# Patient Record
Sex: MALE | Race: WHITE | NOT HISPANIC OR LATINO
[De-identification: names, ages, dates, MRNs, and addresses within clinical notes are randomized per-mention and may not be internally consistent; named-entity substitution may affect disease eponyms.]

---

## 2017-02-02 ENCOUNTER — APPOINTMENT (OUTPATIENT)
Dept: OPHTHALMOLOGY | Facility: CLINIC | Age: 67
End: 2017-02-02

## 2017-02-13 ENCOUNTER — APPOINTMENT (OUTPATIENT)
Dept: OPHTHALMOLOGY | Facility: CLINIC | Age: 67
End: 2017-02-13

## 2017-02-24 ENCOUNTER — APPOINTMENT (OUTPATIENT)
Dept: OPHTHALMOLOGY | Facility: CLINIC | Age: 67
End: 2017-02-24

## 2017-03-17 ENCOUNTER — APPOINTMENT (OUTPATIENT)
Dept: OPHTHALMOLOGY | Facility: CLINIC | Age: 67
End: 2017-03-17

## 2017-04-21 ENCOUNTER — APPOINTMENT (OUTPATIENT)
Dept: OPHTHALMOLOGY | Facility: CLINIC | Age: 67
End: 2017-04-21

## 2017-06-23 ENCOUNTER — APPOINTMENT (OUTPATIENT)
Dept: OPHTHALMOLOGY | Facility: CLINIC | Age: 67
End: 2017-06-23

## 2017-12-14 ENCOUNTER — APPOINTMENT (OUTPATIENT)
Dept: OPHTHALMOLOGY | Facility: CLINIC | Age: 67
End: 2017-12-14
Payer: COMMERCIAL

## 2017-12-14 DIAGNOSIS — H59.89 OTHER POSTPROCEDURAL COMPLICATIONS AND DISORDERS OF EYE AND ADNEXA, NOT ELSEWHERE CLASSIFIED: ICD-10-CM

## 2017-12-14 PROCEDURE — 92083 EXTENDED VISUAL FIELD XM: CPT

## 2017-12-14 PROCEDURE — 92012 INTRM OPH EXAM EST PATIENT: CPT

## 2018-01-05 ENCOUNTER — APPOINTMENT (OUTPATIENT)
Dept: OPHTHALMOLOGY | Facility: CLINIC | Age: 68
End: 2018-01-05
Payer: COMMERCIAL

## 2018-01-05 PROCEDURE — 92012 INTRM OPH EXAM EST PATIENT: CPT

## 2018-01-26 ENCOUNTER — APPOINTMENT (OUTPATIENT)
Dept: OPHTHALMOLOGY | Facility: CLINIC | Age: 68
End: 2018-01-26
Payer: COMMERCIAL

## 2018-01-26 PROCEDURE — 92012 INTRM OPH EXAM EST PATIENT: CPT

## 2018-04-26 ENCOUNTER — APPOINTMENT (OUTPATIENT)
Dept: OPHTHALMOLOGY | Facility: CLINIC | Age: 68
End: 2018-04-26
Payer: COMMERCIAL

## 2018-04-26 PROCEDURE — 92250 FUNDUS PHOTOGRAPHY W/I&R: CPT

## 2018-04-26 PROCEDURE — 92083 EXTENDED VISUAL FIELD XM: CPT

## 2018-04-26 PROCEDURE — 92020 GONIOSCOPY: CPT

## 2018-04-26 PROCEDURE — 92014 COMPRE OPH EXAM EST PT 1/>: CPT

## 2018-04-26 PROCEDURE — ZZZZZ: CPT

## 2018-07-20 ENCOUNTER — RX RENEWAL (OUTPATIENT)
Age: 68
End: 2018-07-20

## 2018-07-26 ENCOUNTER — APPOINTMENT (OUTPATIENT)
Dept: OPHTHALMOLOGY | Facility: CLINIC | Age: 68
End: 2018-07-26
Payer: COMMERCIAL

## 2018-07-26 PROCEDURE — ZZZZZ: CPT

## 2018-07-26 PROCEDURE — 92012 INTRM OPH EXAM EST PATIENT: CPT

## 2018-11-09 ENCOUNTER — RX RENEWAL (OUTPATIENT)
Age: 68
End: 2018-11-09

## 2018-11-15 ENCOUNTER — APPOINTMENT (OUTPATIENT)
Dept: OPHTHALMOLOGY | Facility: CLINIC | Age: 68
End: 2018-11-15
Payer: COMMERCIAL

## 2018-11-15 PROCEDURE — 92014 COMPRE OPH EXAM EST PT 1/>: CPT

## 2018-11-15 RX ORDER — NETARSUDIL 0.2 MG/ML
0.02 SOLUTION/ DROPS OPHTHALMIC; TOPICAL
Qty: 1 | Refills: 6 | Status: ACTIVE | COMMUNITY
Start: 2018-11-15 | End: 1900-01-01

## 2019-01-17 ENCOUNTER — APPOINTMENT (OUTPATIENT)
Dept: OPHTHALMOLOGY | Facility: CLINIC | Age: 69
End: 2019-01-17
Payer: COMMERCIAL

## 2019-01-17 DIAGNOSIS — H40.2213 CHRONIC ANGLE-CLOSURE GLAUCOMA, RIGHT EYE, SEVERE STAGE: ICD-10-CM

## 2019-01-17 DIAGNOSIS — H40.2223 CHRONIC ANGLE-CLOSURE GLAUCOMA, LEFT EYE, SEVERE STAGE: ICD-10-CM

## 2019-01-17 PROCEDURE — 92020 GONIOSCOPY: CPT

## 2019-01-17 PROCEDURE — 92014 COMPRE OPH EXAM EST PT 1/>: CPT

## 2019-01-17 PROCEDURE — 92083 EXTENDED VISUAL FIELD XM: CPT

## 2019-01-17 PROCEDURE — 92250 FUNDUS PHOTOGRAPHY W/I&R: CPT

## 2019-01-17 RX ORDER — GENTAMICIN SULFATE 3 MG/ML
0.3 SOLUTION OPHTHALMIC
Qty: 1 | Refills: 3 | Status: DISCONTINUED | COMMUNITY
Start: 2017-02-06 | End: 2019-01-17

## 2019-05-16 ENCOUNTER — NON-APPOINTMENT (OUTPATIENT)
Age: 69
End: 2019-05-16

## 2019-05-16 ENCOUNTER — APPOINTMENT (OUTPATIENT)
Dept: OPHTHALMOLOGY | Facility: CLINIC | Age: 69
End: 2019-05-16
Payer: COMMERCIAL

## 2019-05-16 PROCEDURE — 92083 EXTENDED VISUAL FIELD XM: CPT

## 2019-05-16 PROCEDURE — 92012 INTRM OPH EXAM EST PATIENT: CPT

## 2019-10-30 ENCOUNTER — NON-APPOINTMENT (OUTPATIENT)
Age: 69
End: 2019-10-30

## 2019-10-30 ENCOUNTER — APPOINTMENT (OUTPATIENT)
Dept: OPHTHALMOLOGY | Facility: CLINIC | Age: 69
End: 2019-10-30
Payer: COMMERCIAL

## 2019-10-30 PROCEDURE — 92012 INTRM OPH EXAM EST PATIENT: CPT

## 2019-10-30 PROCEDURE — 92083 EXTENDED VISUAL FIELD XM: CPT

## 2019-12-11 ENCOUNTER — NON-APPOINTMENT (OUTPATIENT)
Age: 69
End: 2019-12-11

## 2019-12-11 ENCOUNTER — APPOINTMENT (OUTPATIENT)
Dept: OPHTHALMOLOGY | Facility: CLINIC | Age: 69
End: 2019-12-11
Payer: COMMERCIAL

## 2019-12-11 PROCEDURE — 92012 INTRM OPH EXAM EST PATIENT: CPT

## 2020-03-12 ENCOUNTER — NON-APPOINTMENT (OUTPATIENT)
Age: 70
End: 2020-03-12

## 2020-03-12 ENCOUNTER — APPOINTMENT (OUTPATIENT)
Dept: OPHTHALMOLOGY | Facility: CLINIC | Age: 70
End: 2020-03-12
Payer: COMMERCIAL

## 2020-03-12 PROCEDURE — 92083 EXTENDED VISUAL FIELD XM: CPT

## 2020-03-12 PROCEDURE — 92250 FUNDUS PHOTOGRAPHY W/I&R: CPT | Mod: RT

## 2020-03-12 PROCEDURE — 92014 COMPRE OPH EXAM EST PT 1/>: CPT

## 2020-07-02 ENCOUNTER — APPOINTMENT (OUTPATIENT)
Dept: OPHTHALMOLOGY | Facility: CLINIC | Age: 70
End: 2020-07-02
Payer: COMMERCIAL

## 2020-07-02 ENCOUNTER — NON-APPOINTMENT (OUTPATIENT)
Age: 70
End: 2020-07-02

## 2020-07-02 PROCEDURE — 92133 CPTRZD OPH DX IMG PST SGM ON: CPT

## 2020-07-02 PROCEDURE — 99213 OFFICE O/P EST LOW 20 MIN: CPT

## 2021-01-04 ENCOUNTER — APPOINTMENT (OUTPATIENT)
Dept: OPHTHALMOLOGY | Facility: CLINIC | Age: 71
End: 2021-01-04
Payer: COMMERCIAL

## 2021-01-04 ENCOUNTER — NON-APPOINTMENT (OUTPATIENT)
Age: 71
End: 2021-01-04

## 2021-01-04 PROCEDURE — 99072 ADDL SUPL MATRL&STAF TM PHE: CPT

## 2021-01-04 PROCEDURE — 92136 OPHTHALMIC BIOMETRY: CPT

## 2021-01-04 PROCEDURE — 92083 EXTENDED VISUAL FIELD XM: CPT

## 2021-01-04 PROCEDURE — 99213 OFFICE O/P EST LOW 20 MIN: CPT

## 2021-02-10 ENCOUNTER — TRANSCRIPTION ENCOUNTER (OUTPATIENT)
Age: 71
End: 2021-02-10

## 2021-02-19 ENCOUNTER — APPOINTMENT (OUTPATIENT)
Dept: OPHTHALMOLOGY | Facility: CLINIC | Age: 71
End: 2021-02-19
Payer: COMMERCIAL

## 2021-02-19 ENCOUNTER — NON-APPOINTMENT (OUTPATIENT)
Age: 71
End: 2021-02-19

## 2021-02-19 PROCEDURE — 99442: CPT

## 2021-03-01 ENCOUNTER — TRANSCRIPTION ENCOUNTER (OUTPATIENT)
Age: 71
End: 2021-03-01

## 2021-03-02 ENCOUNTER — APPOINTMENT (OUTPATIENT)
Dept: OPHTHALMOLOGY | Facility: AMBULATORY SURGERY CENTER | Age: 71
End: 2021-03-02

## 2021-03-02 ENCOUNTER — OUTPATIENT (OUTPATIENT)
Dept: OUTPATIENT SERVICES | Facility: HOSPITAL | Age: 71
LOS: 1 days | Discharge: HOME | End: 2021-03-02
Payer: COMMERCIAL

## 2021-03-02 PROCEDURE — V2787: CPT

## 2021-03-02 PROCEDURE — 66982 XCAPSL CTRC RMVL CPLX WO ECP: CPT | Mod: RT

## 2021-03-02 PROCEDURE — 66250 FOLLOW-UP SURGERY OF EYE: CPT | Mod: 58,RT

## 2021-03-03 ENCOUNTER — APPOINTMENT (OUTPATIENT)
Dept: OPHTHALMOLOGY | Facility: CLINIC | Age: 71
End: 2021-03-03
Payer: COMMERCIAL

## 2021-03-03 ENCOUNTER — NON-APPOINTMENT (OUTPATIENT)
Age: 71
End: 2021-03-03

## 2021-03-03 PROCEDURE — 99024 POSTOP FOLLOW-UP VISIT: CPT

## 2021-03-10 ENCOUNTER — NON-APPOINTMENT (OUTPATIENT)
Age: 71
End: 2021-03-10

## 2021-03-10 ENCOUNTER — APPOINTMENT (OUTPATIENT)
Dept: OPHTHALMOLOGY | Facility: CLINIC | Age: 71
End: 2021-03-10
Payer: COMMERCIAL

## 2021-03-10 PROCEDURE — 99024 POSTOP FOLLOW-UP VISIT: CPT

## 2021-03-24 ENCOUNTER — NON-APPOINTMENT (OUTPATIENT)
Age: 71
End: 2021-03-24

## 2021-03-24 ENCOUNTER — APPOINTMENT (OUTPATIENT)
Dept: OPHTHALMOLOGY | Facility: CLINIC | Age: 71
End: 2021-03-24
Payer: COMMERCIAL

## 2021-03-24 PROCEDURE — 99024 POSTOP FOLLOW-UP VISIT: CPT

## 2021-04-14 ENCOUNTER — NON-APPOINTMENT (OUTPATIENT)
Age: 71
End: 2021-04-14

## 2021-04-14 ENCOUNTER — APPOINTMENT (OUTPATIENT)
Dept: OPHTHALMOLOGY | Facility: CLINIC | Age: 71
End: 2021-04-14
Payer: COMMERCIAL

## 2021-04-14 PROCEDURE — 99024 POSTOP FOLLOW-UP VISIT: CPT

## 2021-04-16 ENCOUNTER — NON-APPOINTMENT (OUTPATIENT)
Age: 71
End: 2021-04-16

## 2021-04-16 ENCOUNTER — APPOINTMENT (OUTPATIENT)
Dept: OPHTHALMOLOGY | Facility: CLINIC | Age: 71
End: 2021-04-16
Payer: COMMERCIAL

## 2021-04-16 PROCEDURE — 99072 ADDL SUPL MATRL&STAF TM PHE: CPT

## 2021-04-16 PROCEDURE — 92014 COMPRE OPH EXAM EST PT 1/>: CPT | Mod: 24

## 2021-04-16 PROCEDURE — 92020 GONIOSCOPY: CPT

## 2021-04-20 ENCOUNTER — TRANSCRIPTION ENCOUNTER (OUTPATIENT)
Age: 71
End: 2021-04-20

## 2021-04-21 ENCOUNTER — NON-APPOINTMENT (OUTPATIENT)
Age: 71
End: 2021-04-21

## 2021-04-21 ENCOUNTER — APPOINTMENT (OUTPATIENT)
Dept: OPHTHALMOLOGY | Facility: AMBULATORY SURGERY CENTER | Age: 71
End: 2021-04-21

## 2021-04-21 ENCOUNTER — OUTPATIENT (OUTPATIENT)
Dept: OUTPATIENT SERVICES | Facility: HOSPITAL | Age: 71
LOS: 1 days | Discharge: HOME | End: 2021-04-21
Payer: COMMERCIAL

## 2021-04-21 PROCEDURE — 66180 AQUEOUS SHUNT EYE W/GRAFT: CPT | Mod: RT,79

## 2021-04-22 ENCOUNTER — APPOINTMENT (OUTPATIENT)
Dept: OPHTHALMOLOGY | Facility: CLINIC | Age: 71
End: 2021-04-22
Payer: COMMERCIAL

## 2021-04-22 ENCOUNTER — NON-APPOINTMENT (OUTPATIENT)
Age: 71
End: 2021-04-22

## 2021-04-22 PROCEDURE — 99024 POSTOP FOLLOW-UP VISIT: CPT

## 2021-04-26 ENCOUNTER — APPOINTMENT (OUTPATIENT)
Dept: OPHTHALMOLOGY | Facility: CLINIC | Age: 71
End: 2021-04-26
Payer: COMMERCIAL

## 2021-04-26 ENCOUNTER — NON-APPOINTMENT (OUTPATIENT)
Age: 71
End: 2021-04-26

## 2021-04-26 PROCEDURE — 99024 POSTOP FOLLOW-UP VISIT: CPT

## 2021-04-28 ENCOUNTER — APPOINTMENT (OUTPATIENT)
Dept: OPHTHALMOLOGY | Facility: CLINIC | Age: 71
End: 2021-04-28
Payer: COMMERCIAL

## 2021-04-28 ENCOUNTER — NON-APPOINTMENT (OUTPATIENT)
Age: 71
End: 2021-04-28

## 2021-04-28 PROCEDURE — 99024 POSTOP FOLLOW-UP VISIT: CPT

## 2021-05-07 ENCOUNTER — NON-APPOINTMENT (OUTPATIENT)
Age: 71
End: 2021-05-07

## 2021-05-07 ENCOUNTER — APPOINTMENT (OUTPATIENT)
Dept: OPHTHALMOLOGY | Facility: CLINIC | Age: 71
End: 2021-05-07
Payer: COMMERCIAL

## 2021-05-07 PROCEDURE — 99024 POSTOP FOLLOW-UP VISIT: CPT

## 2021-05-25 ENCOUNTER — NON-APPOINTMENT (OUTPATIENT)
Age: 71
End: 2021-05-25

## 2021-05-25 ENCOUNTER — APPOINTMENT (OUTPATIENT)
Dept: OPHTHALMOLOGY | Facility: CLINIC | Age: 71
End: 2021-05-25

## 2021-05-25 ENCOUNTER — APPOINTMENT (OUTPATIENT)
Dept: OPHTHALMOLOGY | Facility: CLINIC | Age: 71
End: 2021-05-25
Payer: COMMERCIAL

## 2021-05-25 PROCEDURE — 99024 POSTOP FOLLOW-UP VISIT: CPT

## 2021-06-04 ENCOUNTER — NON-APPOINTMENT (OUTPATIENT)
Age: 71
End: 2021-06-04

## 2021-06-04 ENCOUNTER — APPOINTMENT (OUTPATIENT)
Dept: OPHTHALMOLOGY | Facility: CLINIC | Age: 71
End: 2021-06-04
Payer: COMMERCIAL

## 2021-06-04 PROCEDURE — 99024 POSTOP FOLLOW-UP VISIT: CPT

## 2021-06-24 ENCOUNTER — NON-APPOINTMENT (OUTPATIENT)
Age: 71
End: 2021-06-24

## 2021-06-24 ENCOUNTER — TRANSCRIPTION ENCOUNTER (OUTPATIENT)
Age: 71
End: 2021-06-24

## 2021-06-24 ENCOUNTER — APPOINTMENT (OUTPATIENT)
Dept: OPHTHALMOLOGY | Facility: CLINIC | Age: 71
End: 2021-06-24
Payer: COMMERCIAL

## 2021-06-24 PROCEDURE — 99024 POSTOP FOLLOW-UP VISIT: CPT

## 2021-08-02 ENCOUNTER — APPOINTMENT (OUTPATIENT)
Dept: OPHTHALMOLOGY | Facility: CLINIC | Age: 71
End: 2021-08-02
Payer: COMMERCIAL

## 2021-08-02 ENCOUNTER — NON-APPOINTMENT (OUTPATIENT)
Age: 71
End: 2021-08-02

## 2021-08-02 PROCEDURE — 92133 CPTRZD OPH DX IMG PST SGM ON: CPT

## 2021-08-02 PROCEDURE — 92014 COMPRE OPH EXAM EST PT 1/>: CPT

## 2021-11-08 ENCOUNTER — NON-APPOINTMENT (OUTPATIENT)
Age: 71
End: 2021-11-08

## 2021-11-08 ENCOUNTER — APPOINTMENT (OUTPATIENT)
Dept: OPHTHALMOLOGY | Facility: CLINIC | Age: 71
End: 2021-11-08
Payer: COMMERCIAL

## 2021-11-08 PROCEDURE — 92083 EXTENDED VISUAL FIELD XM: CPT

## 2021-11-08 PROCEDURE — 92133 CPTRZD OPH DX IMG PST SGM ON: CPT

## 2021-11-08 PROCEDURE — 92014 COMPRE OPH EXAM EST PT 1/>: CPT

## 2021-11-15 ENCOUNTER — NON-APPOINTMENT (OUTPATIENT)
Age: 71
End: 2021-11-15

## 2021-11-15 ENCOUNTER — APPOINTMENT (OUTPATIENT)
Dept: OPHTHALMOLOGY | Facility: CLINIC | Age: 71
End: 2021-11-15
Payer: COMMERCIAL

## 2021-11-15 PROCEDURE — 92012 INTRM OPH EXAM EST PATIENT: CPT

## 2021-11-15 PROCEDURE — 92134 CPTRZ OPH DX IMG PST SGM RTA: CPT

## 2021-11-30 ENCOUNTER — NON-APPOINTMENT (OUTPATIENT)
Age: 71
End: 2021-11-30

## 2021-11-30 ENCOUNTER — APPOINTMENT (OUTPATIENT)
Dept: OPHTHALMOLOGY | Facility: CLINIC | Age: 71
End: 2021-11-30
Payer: COMMERCIAL

## 2021-11-30 PROCEDURE — 92012 INTRM OPH EXAM EST PATIENT: CPT

## 2021-12-13 ENCOUNTER — APPOINTMENT (OUTPATIENT)
Dept: OPHTHALMOLOGY | Facility: CLINIC | Age: 71
End: 2021-12-13
Payer: COMMERCIAL

## 2021-12-13 ENCOUNTER — NON-APPOINTMENT (OUTPATIENT)
Age: 71
End: 2021-12-13

## 2021-12-13 PROCEDURE — 92134 CPTRZ OPH DX IMG PST SGM RTA: CPT

## 2021-12-13 PROCEDURE — 92012 INTRM OPH EXAM EST PATIENT: CPT

## 2021-12-17 ENCOUNTER — NON-APPOINTMENT (OUTPATIENT)
Age: 71
End: 2021-12-17

## 2021-12-17 ENCOUNTER — APPOINTMENT (OUTPATIENT)
Dept: OPHTHALMOLOGY | Facility: CLINIC | Age: 71
End: 2021-12-17
Payer: COMMERCIAL

## 2021-12-17 PROCEDURE — 92012 INTRM OPH EXAM EST PATIENT: CPT

## 2022-01-07 ENCOUNTER — NON-APPOINTMENT (OUTPATIENT)
Age: 72
End: 2022-01-07

## 2022-01-07 ENCOUNTER — APPOINTMENT (OUTPATIENT)
Dept: OPHTHALMOLOGY | Facility: CLINIC | Age: 72
End: 2022-01-07
Payer: COMMERCIAL

## 2022-01-07 PROCEDURE — 99072 ADDL SUPL MATRL&STAF TM PHE: CPT

## 2022-01-07 PROCEDURE — 92012 INTRM OPH EXAM EST PATIENT: CPT

## 2022-02-01 ENCOUNTER — NON-APPOINTMENT (OUTPATIENT)
Age: 72
End: 2022-02-01

## 2022-02-01 ENCOUNTER — APPOINTMENT (OUTPATIENT)
Dept: OPHTHALMOLOGY | Facility: CLINIC | Age: 72
End: 2022-02-01
Payer: COMMERCIAL

## 2022-02-01 PROCEDURE — 99072 ADDL SUPL MATRL&STAF TM PHE: CPT

## 2022-02-01 PROCEDURE — 92012 INTRM OPH EXAM EST PATIENT: CPT

## 2022-02-09 ENCOUNTER — NON-APPOINTMENT (OUTPATIENT)
Age: 72
End: 2022-02-09

## 2022-02-09 ENCOUNTER — APPOINTMENT (OUTPATIENT)
Dept: OPHTHALMOLOGY | Facility: CLINIC | Age: 72
End: 2022-02-09
Payer: COMMERCIAL

## 2022-02-09 PROCEDURE — 99072 ADDL SUPL MATRL&STAF TM PHE: CPT

## 2022-02-09 PROCEDURE — 92083 EXTENDED VISUAL FIELD XM: CPT

## 2022-02-09 PROCEDURE — 92250 FUNDUS PHOTOGRAPHY W/I&R: CPT

## 2022-02-09 PROCEDURE — 92014 COMPRE OPH EXAM EST PT 1/>: CPT

## 2022-04-12 ENCOUNTER — APPOINTMENT (OUTPATIENT)
Dept: OPHTHALMOLOGY | Facility: CLINIC | Age: 72
End: 2022-04-12
Payer: COMMERCIAL

## 2022-04-12 ENCOUNTER — NON-APPOINTMENT (OUTPATIENT)
Age: 72
End: 2022-04-12

## 2022-04-12 PROCEDURE — 92012 INTRM OPH EXAM EST PATIENT: CPT

## 2022-04-21 ENCOUNTER — APPOINTMENT (OUTPATIENT)
Dept: OPHTHALMOLOGY | Facility: CLINIC | Age: 72
End: 2022-04-21
Payer: COMMERCIAL

## 2022-04-21 ENCOUNTER — NON-APPOINTMENT (OUTPATIENT)
Age: 72
End: 2022-04-21

## 2022-04-21 PROCEDURE — 99442: CPT

## 2022-05-02 ENCOUNTER — NON-APPOINTMENT (OUTPATIENT)
Age: 72
End: 2022-05-02

## 2022-05-02 ENCOUNTER — APPOINTMENT (OUTPATIENT)
Dept: OPHTHALMOLOGY | Facility: CLINIC | Age: 72
End: 2022-05-02
Payer: COMMERCIAL

## 2022-05-02 PROCEDURE — 99442: CPT

## 2022-05-05 ENCOUNTER — NON-APPOINTMENT (OUTPATIENT)
Age: 72
End: 2022-05-05

## 2022-05-05 ENCOUNTER — APPOINTMENT (OUTPATIENT)
Dept: OPHTHALMOLOGY | Facility: CLINIC | Age: 72
End: 2022-05-05
Payer: COMMERCIAL

## 2022-05-05 PROCEDURE — 92012 INTRM OPH EXAM EST PATIENT: CPT

## 2022-06-08 ENCOUNTER — NON-APPOINTMENT (OUTPATIENT)
Age: 72
End: 2022-06-08

## 2022-06-08 ENCOUNTER — APPOINTMENT (OUTPATIENT)
Dept: OPHTHALMOLOGY | Facility: CLINIC | Age: 72
End: 2022-06-08
Payer: COMMERCIAL

## 2022-06-08 PROCEDURE — 92012 INTRM OPH EXAM EST PATIENT: CPT

## 2022-07-25 ENCOUNTER — APPOINTMENT (OUTPATIENT)
Dept: OPHTHALMOLOGY | Facility: CLINIC | Age: 72
End: 2022-07-25

## 2022-08-25 ENCOUNTER — APPOINTMENT (OUTPATIENT)
Dept: OPHTHALMOLOGY | Facility: CLINIC | Age: 72
End: 2022-08-25

## 2022-08-25 ENCOUNTER — NON-APPOINTMENT (OUTPATIENT)
Age: 72
End: 2022-08-25

## 2022-08-25 PROCEDURE — 92134 CPTRZ OPH DX IMG PST SGM RTA: CPT

## 2022-08-25 PROCEDURE — 92014 COMPRE OPH EXAM EST PT 1/>: CPT

## 2022-08-30 ENCOUNTER — APPOINTMENT (OUTPATIENT)
Dept: OPHTHALMOLOGY | Facility: CLINIC | Age: 72
End: 2022-08-30

## 2022-08-30 ENCOUNTER — NON-APPOINTMENT (OUTPATIENT)
Age: 72
End: 2022-08-30

## 2022-08-30 PROCEDURE — 92012 INTRM OPH EXAM EST PATIENT: CPT

## 2022-09-01 ENCOUNTER — APPOINTMENT (OUTPATIENT)
Dept: OPHTHALMOLOGY | Facility: CLINIC | Age: 72
End: 2022-09-01

## 2022-09-01 ENCOUNTER — NON-APPOINTMENT (OUTPATIENT)
Age: 72
End: 2022-09-01

## 2022-09-01 PROCEDURE — 92012 INTRM OPH EXAM EST PATIENT: CPT

## 2022-09-30 NOTE — ASU PATIENT PROFILE, ADULT - NSICDXPASTMEDICALHX_GEN_ALL_CORE_FT
PAST MEDICAL HISTORY:  CAD (coronary artery disease)     Hypercholesteremia     Mild asthma pt states he doent get attacks and hasnt used albuterol in 20+ years    Stented coronary artery

## 2022-09-30 NOTE — OPERATIVE REPORT - OPERATIVE RPOSRT DETAILS
DATE OF SURGERY:10-3-22    Surgeon:  Luisito White    PRE-OP DIAGNOSIS: Cornea Edema _Right Eye    POST-OP DIAGNOSIS: Cornea Edema _Right Eye    ANESTHESIA: MAC    PROCEDURE: DSAEK Right_Eye;  8.0 mm donor ;  Descemet stripping automated endothelial keratoplasty      SPECIMEN/TISSUE REMOVED: None    ESTIMATED BLOOD LOSS: < 1mL    COMPLICATIONS: None    DESCRIPTION OF PROCEDURE: This patient is having DSAEK for cornea edema on the right eye. Patient was brought to the operating room after receiving peribulbar injection of lidocaine, marcaine, and wydase equivalent.  The eye was massaged to soften the eye. The patient was prepped and draped in the usual sterile fashion. A lid speculum was placed between the lids of the eye. The size of the graft was determined. Three para sites were made, one 180 degrees from wound and two 90 degrees from wound.  The wound was fashioned with a 2.4mm keratome.  Trypan Blue was instilled to stain the endothelium.  Healon was then instilled. Using a reverse Sinsky, the Descemet was scored subtending a diameter of 8mm. Descemets was removed.  The healon was removed using  irrigation and aspiration.  The wound was widened to 5mm using a blade.  The precut cornea, donor number ___________was trephined using an 8mm moria trephine.  The button was brought to the field; a 30g needle was bent; Healon was placed posterior to the wound; the donor endothelium was  from the button, endo down, and pushed into the eye with the 30 g needle. The wound was sutured with 10-0 nylon sutures to make the wound water tight.  BSS was instilled into the eye, the graft positioned, and air placed underneath to attach.  5-10 min were allowed for adhesion and the bubble was left to fill approximately 80% of the eye. The wound was checked to be watertight. Corticosteroid and antibiotic were injected subonjunctivally. The eye was coated with Maxitrol ophthalmic ointment. The eye was covered with a patch and plastic shield. The patient left the OR in stable condition to lie face up for one hour post procedure having tolerated the procedure well. ILuisito was present throughout the case. DATE OF SURGERY:10-3-22    Surgeon:  Luisito White  Assist:  Suyeon Yu, MD    PRE-OP DIAGNOSIS: Cornea Edema _Right Eye    POST-OP DIAGNOSIS: Cornea Edema _Right Eye    ANESTHESIA: MAC    PROCEDURE: DSAEK Right_Eye;  8.0 mm donor ;  Descemet stripping automated endothelial keratoplasty  Donor [id number]      SPECIMEN/TISSUE REMOVED: None    ESTIMATED BLOOD LOSS: < 1mL    COMPLICATIONS: None    DESCRIPTION OF PROCEDURE: This patient is having DSAEK for cornea edema on the right eye. Patient was brought to the operating room after receiving peribulbar injection of lidocaine, marcaine, and wydase equivalent.  The eye was massaged to soften the eye. The patient was prepped and draped in the usual sterile fashion. A lid speculum was placed between the lids of the eye. The size of the graft was determined. Three para sites were made, one 180 degrees from wound and two 90 degrees from wound.  The wound was fashioned with a 2.4mm keratome.  Trypan Blue was instilled to stain the endothelium.  Healon was then instilled. Using a reverse Sinsky, the Descemet was scored subtending a diameter of 8mm. Descemets was removed.  The healon was removed using  irrigation and aspiration.  The wound was widened to 5mm using a blade.  The precut cornea, donor number [id number] was trephined using an 8mm moria trephine.  The button was brought to the field; a 30g needle was bent; Healon was placed posterior to the wound; the donor endothelium was  from the button, endo down, and pushed into the eye with the 30 g needle. The wound was sutured with 10-0 nylon sutures to make the wound water tight.  BSS was instilled into the eye, the graft positioned, and SF6 gas was placed underneath to attach.  5-10 min were allowed for adhesion and the bubble was left to fill approximately 80% of the eye. The wound was checked to be watertight. Corticosteroid and antibiotic were used to irrigate surface of eye. The eye was coated with Maxitrol ophthalmic ointment. The eye was covered with a patch and plastic shield. The patient left the OR in stable condition to lie face up for one hour post procedure having tolerated the procedure well. Luisito DIETZ was present throughout the case.

## 2022-09-30 NOTE — ASU PATIENT PROFILE, ADULT - NS PREOP UNDERSTANDS INFO
spoke to patient, NPO after 12Mn, so solid food, no smoking, drinking, bring Id photo , insurance  and vaccination cards, escort arranged, address and telephone given to patient/yes

## 2022-10-03 ENCOUNTER — RESULT REVIEW (OUTPATIENT)
Age: 72
End: 2022-10-03

## 2022-10-03 ENCOUNTER — OUTPATIENT (OUTPATIENT)
Dept: OUTPATIENT SERVICES | Facility: HOSPITAL | Age: 72
LOS: 1 days | Discharge: HOME | End: 2022-10-03

## 2022-10-03 ENCOUNTER — NON-APPOINTMENT (OUTPATIENT)
Age: 72
End: 2022-10-03

## 2022-10-03 ENCOUNTER — APPOINTMENT (OUTPATIENT)
Dept: OPHTHALMOLOGY | Facility: AMBULATORY SURGERY CENTER | Age: 72
End: 2022-10-03

## 2022-10-03 VITALS
OXYGEN SATURATION: 100 % | RESPIRATION RATE: 14 BRPM | HEART RATE: 66 BPM | SYSTOLIC BLOOD PRESSURE: 122 MMHG | DIASTOLIC BLOOD PRESSURE: 66 MMHG

## 2022-10-03 VITALS
RESPIRATION RATE: 16 BRPM | WEIGHT: 179.46 LBS | HEART RATE: 60 BPM | TEMPERATURE: 99 F | DIASTOLIC BLOOD PRESSURE: 70 MMHG | HEIGHT: 69 IN | OXYGEN SATURATION: 100 % | SYSTOLIC BLOOD PRESSURE: 134 MMHG

## 2022-10-03 DIAGNOSIS — Z95.5 PRESENCE OF CORONARY ANGIOPLASTY IMPLANT AND GRAFT: Chronic | ICD-10-CM

## 2022-10-03 LAB
ALBUMIN SERPL ELPH-MCNC: 3.7 G/DL — SIGNIFICANT CHANGE UP (ref 3.3–5)
ALP SERPL-CCNC: 54 U/L — SIGNIFICANT CHANGE UP (ref 40–120)
ALT FLD-CCNC: 20 U/L — SIGNIFICANT CHANGE UP (ref 10–45)
ANION GAP SERPL CALC-SCNC: 7 MMOL/L — SIGNIFICANT CHANGE UP (ref 5–17)
AST SERPL-CCNC: 25 U/L — SIGNIFICANT CHANGE UP (ref 10–40)
BILIRUB SERPL-MCNC: 1 MG/DL — SIGNIFICANT CHANGE UP (ref 0.2–1.2)
BUN SERPL-MCNC: 12 MG/DL — SIGNIFICANT CHANGE UP (ref 7–23)
CALCIUM SERPL-MCNC: 9.3 MG/DL — SIGNIFICANT CHANGE UP (ref 8.4–10.5)
CHLORIDE SERPL-SCNC: 104 MMOL/L — SIGNIFICANT CHANGE UP (ref 96–108)
CO2 SERPL-SCNC: 31 MMOL/L — SIGNIFICANT CHANGE UP (ref 22–31)
CREAT SERPL-MCNC: 0.9 MG/DL — SIGNIFICANT CHANGE UP (ref 0.5–1.3)
EGFR: 91 ML/MIN/1.73M2 — SIGNIFICANT CHANGE UP
GLUCOSE SERPL-MCNC: 107 MG/DL — HIGH (ref 70–99)
GRAM STN FLD: SIGNIFICANT CHANGE UP
POTASSIUM SERPL-MCNC: 4.5 MMOL/L — SIGNIFICANT CHANGE UP (ref 3.5–5.3)
POTASSIUM SERPL-SCNC: 4.5 MMOL/L — SIGNIFICANT CHANGE UP (ref 3.5–5.3)
PROT SERPL-MCNC: 6.5 G/DL — SIGNIFICANT CHANGE UP (ref 6–8.3)
SODIUM SERPL-SCNC: 142 MMOL/L — SIGNIFICANT CHANGE UP (ref 135–145)
SPECIMEN SOURCE: SIGNIFICANT CHANGE UP

## 2022-10-03 PROCEDURE — 65756 CORNEAL TRNSPL ENDOTHELIAL: CPT | Mod: RT

## 2022-10-03 PROCEDURE — 88304 TISSUE EXAM BY PATHOLOGIST: CPT | Mod: 26

## 2022-10-03 DEVICE — GAS IOL HEXAFLUORIDE CYL: Type: IMPLANTABLE DEVICE | Site: RIGHT | Status: FUNCTIONAL

## 2022-10-03 RX ORDER — SODIUM CHLORIDE 9 MG/ML
250 INJECTION, SOLUTION INTRAVENOUS
Refills: 0 | Status: DISCONTINUED | OUTPATIENT
Start: 2022-10-03 | End: 2022-10-03

## 2022-10-03 RX ORDER — OFLOXACIN 0.3 %
1 DROPS OPHTHALMIC (EYE)
Refills: 0 | Status: COMPLETED | OUTPATIENT
Start: 2022-10-03 | End: 2022-10-03

## 2022-10-03 RX ORDER — ACETAMINOPHEN 500 MG
650 TABLET ORAL ONCE
Refills: 0 | Status: DISCONTINUED | OUTPATIENT
Start: 2022-10-03 | End: 2022-10-03

## 2022-10-03 RX ADMIN — SODIUM CHLORIDE 100 MILLILITER(S): 9 INJECTION, SOLUTION INTRAVENOUS at 14:32

## 2022-10-03 RX ADMIN — Medication 1 DROP(S): at 11:21

## 2022-10-03 RX ADMIN — Medication 1 DROP(S): at 11:16

## 2022-10-03 RX ADMIN — Medication 1 DROP(S): at 11:11

## 2022-10-04 ENCOUNTER — APPOINTMENT (OUTPATIENT)
Dept: OPHTHALMOLOGY | Facility: CLINIC | Age: 72
End: 2022-10-04

## 2022-10-04 ENCOUNTER — NON-APPOINTMENT (OUTPATIENT)
Age: 72
End: 2022-10-04

## 2022-10-04 PROBLEM — E78.00 PURE HYPERCHOLESTEROLEMIA, UNSPECIFIED: Chronic | Status: ACTIVE | Noted: 2022-10-03

## 2022-10-04 PROBLEM — J45.909 UNSPECIFIED ASTHMA, UNCOMPLICATED: Chronic | Status: ACTIVE | Noted: 2022-10-03

## 2022-10-04 PROBLEM — I25.10 ATHEROSCLEROTIC HEART DISEASE OF NATIVE CORONARY ARTERY WITHOUT ANGINA PECTORIS: Chronic | Status: ACTIVE | Noted: 2022-10-03

## 2022-10-04 PROCEDURE — 99024 POSTOP FOLLOW-UP VISIT: CPT

## 2022-10-11 ENCOUNTER — NON-APPOINTMENT (OUTPATIENT)
Age: 72
End: 2022-10-11

## 2022-10-11 ENCOUNTER — APPOINTMENT (OUTPATIENT)
Dept: OPHTHALMOLOGY | Facility: CLINIC | Age: 72
End: 2022-10-11

## 2022-10-11 PROCEDURE — 65222 REMOVE FOREIGN BODY FROM EYE: CPT | Mod: 79,RT

## 2022-10-12 ENCOUNTER — APPOINTMENT (OUTPATIENT)
Dept: OPHTHALMOLOGY | Facility: CLINIC | Age: 72
End: 2022-10-12

## 2022-10-17 LAB — SURGICAL PATHOLOGY STUDY: SIGNIFICANT CHANGE UP

## 2022-10-24 ENCOUNTER — NON-APPOINTMENT (OUTPATIENT)
Age: 72
End: 2022-10-24

## 2022-10-24 ENCOUNTER — APPOINTMENT (OUTPATIENT)
Dept: OPHTHALMOLOGY | Facility: CLINIC | Age: 72
End: 2022-10-24

## 2022-10-24 LAB
CULTURE RESULTS: NO GROWTH — SIGNIFICANT CHANGE UP
SPECIMEN SOURCE: SIGNIFICANT CHANGE UP

## 2022-10-24 PROCEDURE — 92012 INTRM OPH EXAM EST PATIENT: CPT | Mod: 24

## 2022-10-24 PROCEDURE — 92134 CPTRZ OPH DX IMG PST SGM RTA: CPT

## 2022-11-15 ENCOUNTER — APPOINTMENT (OUTPATIENT)
Dept: OPHTHALMOLOGY | Facility: CLINIC | Age: 72
End: 2022-11-15

## 2022-11-15 ENCOUNTER — NON-APPOINTMENT (OUTPATIENT)
Age: 72
End: 2022-11-15

## 2022-11-15 PROCEDURE — 92012 INTRM OPH EXAM EST PATIENT: CPT | Mod: 24

## 2022-11-16 ENCOUNTER — NON-APPOINTMENT (OUTPATIENT)
Age: 72
End: 2022-11-16

## 2022-11-16 ENCOUNTER — APPOINTMENT (OUTPATIENT)
Dept: OPHTHALMOLOGY | Facility: CLINIC | Age: 72
End: 2022-11-16

## 2022-11-16 PROCEDURE — 92012 INTRM OPH EXAM EST PATIENT: CPT | Mod: 24

## 2022-11-16 PROCEDURE — 92134 CPTRZ OPH DX IMG PST SGM RTA: CPT

## 2022-12-07 ENCOUNTER — APPOINTMENT (OUTPATIENT)
Dept: OPHTHALMOLOGY | Facility: CLINIC | Age: 72
End: 2022-12-07

## 2022-12-07 ENCOUNTER — NON-APPOINTMENT (OUTPATIENT)
Age: 72
End: 2022-12-07

## 2022-12-07 PROCEDURE — 92133 CPTRZD OPH DX IMG PST SGM ON: CPT

## 2022-12-07 PROCEDURE — 92012 INTRM OPH EXAM EST PATIENT: CPT | Mod: 24

## 2022-12-08 ENCOUNTER — NON-APPOINTMENT (OUTPATIENT)
Age: 72
End: 2022-12-08

## 2022-12-08 ENCOUNTER — APPOINTMENT (OUTPATIENT)
Dept: OPHTHALMOLOGY | Facility: CLINIC | Age: 72
End: 2022-12-08

## 2022-12-08 PROCEDURE — 92012 INTRM OPH EXAM EST PATIENT: CPT | Mod: 24

## 2022-12-21 ENCOUNTER — APPOINTMENT (OUTPATIENT)
Dept: OPHTHALMOLOGY | Facility: CLINIC | Age: 72
End: 2022-12-21

## 2022-12-21 ENCOUNTER — NON-APPOINTMENT (OUTPATIENT)
Age: 72
End: 2022-12-21

## 2022-12-21 PROCEDURE — 92012 INTRM OPH EXAM EST PATIENT: CPT | Mod: 24

## 2022-12-21 PROCEDURE — 92134 CPTRZ OPH DX IMG PST SGM RTA: CPT

## 2023-01-12 ENCOUNTER — APPOINTMENT (OUTPATIENT)
Dept: OPHTHALMOLOGY | Facility: CLINIC | Age: 73
End: 2023-01-12
Payer: COMMERCIAL

## 2023-01-12 ENCOUNTER — NON-APPOINTMENT (OUTPATIENT)
Age: 73
End: 2023-01-12

## 2023-01-12 PROCEDURE — 92136 OPHTHALMIC BIOMETRY: CPT

## 2023-01-12 PROCEDURE — 92083 EXTENDED VISUAL FIELD XM: CPT

## 2023-01-12 PROCEDURE — 92250 FUNDUS PHOTOGRAPHY W/I&R: CPT

## 2023-01-12 PROCEDURE — 92014 COMPRE OPH EXAM EST PT 1/>: CPT

## 2023-01-24 ENCOUNTER — APPOINTMENT (OUTPATIENT)
Dept: OPHTHALMOLOGY | Facility: CLINIC | Age: 73
End: 2023-01-24
Payer: COMMERCIAL

## 2023-01-24 ENCOUNTER — NON-APPOINTMENT (OUTPATIENT)
Age: 73
End: 2023-01-24

## 2023-01-24 PROCEDURE — 92012 INTRM OPH EXAM EST PATIENT: CPT

## 2023-04-11 ENCOUNTER — APPOINTMENT (OUTPATIENT)
Dept: OPHTHALMOLOGY | Facility: CLINIC | Age: 73
End: 2023-04-11
Payer: COMMERCIAL

## 2023-04-11 ENCOUNTER — NON-APPOINTMENT (OUTPATIENT)
Age: 73
End: 2023-04-11

## 2023-04-11 PROCEDURE — 92012 INTRM OPH EXAM EST PATIENT: CPT

## 2023-04-12 ENCOUNTER — APPOINTMENT (OUTPATIENT)
Dept: OPHTHALMOLOGY | Facility: CLINIC | Age: 73
End: 2023-04-12
Payer: COMMERCIAL

## 2023-04-12 ENCOUNTER — NON-APPOINTMENT (OUTPATIENT)
Age: 73
End: 2023-04-12

## 2023-04-12 PROCEDURE — 92012 INTRM OPH EXAM EST PATIENT: CPT

## 2023-04-12 PROCEDURE — G2012 BRIEF CHECK IN BY MD/QHP: CPT

## 2023-05-02 ENCOUNTER — NON-APPOINTMENT (OUTPATIENT)
Age: 73
End: 2023-05-02

## 2023-05-02 ENCOUNTER — APPOINTMENT (OUTPATIENT)
Dept: OPHTHALMOLOGY | Facility: CLINIC | Age: 73
End: 2023-05-02
Payer: COMMERCIAL

## 2023-05-02 PROCEDURE — 92012 INTRM OPH EXAM EST PATIENT: CPT

## 2023-05-10 ENCOUNTER — APPOINTMENT (OUTPATIENT)
Dept: OPHTHALMOLOGY | Facility: CLINIC | Age: 73
End: 2023-05-10
Payer: COMMERCIAL

## 2023-05-10 ENCOUNTER — NON-APPOINTMENT (OUTPATIENT)
Age: 73
End: 2023-05-10

## 2023-05-10 PROCEDURE — 92136 OPHTHALMIC BIOMETRY: CPT

## 2023-05-10 PROCEDURE — 92012 INTRM OPH EXAM EST PATIENT: CPT

## 2023-05-15 NOTE — ASU PATIENT PROFILE, ADULT - NSICDXPASTSURGICALHX_GEN_ALL_CORE_FT
PAST SURGICAL HISTORY:  History of surgery (multiple right eye surgery)right eye cataract and glaucoma, right eye glaucoma, corneal transplant right eye    Stented coronary artery      PAST SURGICAL HISTORY:  History of surgery (multiple right eye surgery)right eye cataract and glaucoma, right eye glaucoma, corneal transplant right eye    History of surgery left eye    Stented coronary artery

## 2023-05-15 NOTE — ASU PATIENT PROFILE, ADULT - NS PREOP UNDERSTANDS INFO
No solid food/dairy/candy/gum after midnight; water allowed before 08:00am tomorrow; patient to bring photo ID/insurance card; no jewelries/valuables/contact lens; dress in comfortable clothes; no smoking/alcohol/recreational drug use; escort to bring photo ID; address and callback number ws given/yes

## 2023-05-15 NOTE — ASU PATIENT PROFILE, ADULT - TEACHING/LEARNING FACTORS IMPACT ABILITY TO LEARN
Initial Anesthesia Post-op Note    Patient: Kenroy Caldwell  Procedure(s) Performed: LEFT KNEE SCOPE,MEDIAL/LATERAL MENISECTOMY  Anesthesia type: General    Vitals Value Taken Time   Temp 36.5 °C (97.7 °F) 07/01/22 0835   Pulse 114 07/01/22 0835   Resp 14 07/01/22 0835   SpO2 100 % 07/01/22 0835   /79 07/01/22 0835         Patient Location: PACU Phase 1  Post-op Vital Signs:stable  Level of Consciousness: awake and alert  Respiratory Status: spontaneous ventilation  Cardiovascular stable  Hydration: euvolemic  Pain Management: adequately controlled  Handoff: Handoff to receiving clinician was performed and questions were answered  Vomiting: none  Nausea: None  Airway Patency:patent  Post-op Assessment: no complications, patient tolerated procedure well with no complications, dentition within defined limits and moving all extremities      No complications documented.   none

## 2023-05-15 NOTE — ASU PATIENT PROFILE, ADULT - FALL HARM RISK - PATIENT NEEDS ASSISTANCE
No assistance needed Cheek-To-Nose Interpolation Flap Text: A decision was made to reconstruct the defect utilizing an interpolation axial flap and a staged reconstruction.  A telfa template was made of the defect.  This telfa template was then used to outline the Cheek-To-Nose Interpolation flap.  The donor area for the pedicle flap was then injected with anesthesia.  The flap was excised through the skin and subcutaneous tissue down to the layer of the underlying musculature.  The interpolation flap was carefully excised within this deep plane to maintain its blood supply.  The edges of the donor site were undermined.   The donor site was closed in a primary fashion.  The pedicle was then rotated into position and sutured.  Once the tube was sutured into place, adequate blood supply was confirmed with blanching and refill.  The pedicle was then wrapped with xeroform gauze and dressed appropriately with a telfa and gauze bandage to ensure continued blood supply and protect the attached pedicle.

## 2023-05-15 NOTE — OPERATIVE REPORT - OPERATIVE RPOSRT DETAILS
DATE OF PROCEDURE: 05/16/23    SUREGEON : SOCORRO LITTLE MD      ASSISTANT(S):  MAX CLARK MD    ANESTHESIA:  MAC.    PREOPERATIVE DIAGNOSIS(ES):  Cataract, astigmatism and glaucoma, left eye.    POSTOPERATIVE DIAGNOSIS(ES):  Cataract, astigmatism and glaucoma, left eye.    OPERATION:  Cataract extraction with toric intraocular lens insertion and trabeculectomy revision, left eye.    IMPLANTS:  J[employer]J Eyhance Toric [id number] 23.0 D lens, serial number ***, expiration date ***    COMPLICATIONS:  None.    SPECIMENS:  None.    ESTIMATED BLOOD LOSS: <0.5 cc    DESCRIPTION OF PROCEDURE:  The patient was identified in the holding area.  The risks, benefits, and alternatives of surgery were discussed with the patient at length.  Informed consent was obtained.  The left eye was identified and marked.  The patient was brought to the operating room.  The cornea was marked at 6 and 9 o'clock meridians while seated in an upright position.  Then, the patient was then placed in a supine position on the operating table. The left eye was prepped and draped in the usual sterile fashion for intraocular surgery, and a lid speculum was then placed underneath the operative eye.  A Bazzi ring was placed on the cornea, and the 92 degree axis was marked. A sideport blade was used to create a paracentesis.  Preservative-free 1% lidocaine was injected into the anterior chamber. Healon was injected to fill the anterior chamber.    A 2.4 keratome was used to create a temporal clear corneal incision.  A cystotome was used to begin a continuous curvilinear capsulorrhexis which was finished with Utrata forceps. Then Viscoat was injected into the ostium of the trabeculectomy. Hydrodissection was done with BSS, and the lens was noted to rotate freely in the capsular bag.  Phacoemulsification was accomplished using the divide-and-conquer technique without complications. Residual cortical material was removed using single handpiece irrigation and aspiration.  Lidocaine 1% was injected into the anterior chamber.  Healon was then injected into the capsular bag.    A [id number] 23.0 D lens was implanted into the capsular bag and rotated to the 92 degree axis using a Kuglen hook. Viscoelastic was removed using irrigation and aspiration along with residual cortical material. The tip of the IA handpiece was then inserted into the ostium of the trabeculectomy. Irrigation/aspiration was performed in the ostium site with good posterior flow noted. Miochol*** was injected into the anterior chamber.  A 10-0 nylon suture was used to close the main wound. All wounds were hydrated and found to be watertight. The lens was centered, and the anterior chamber was deep.  Intraocular pressure was kept in the teens at the end of the case to prevent postoperative bleeding.  No complications were noted.  Topical antibiotics and steroids were applied to the surface of the left eye.  The eyelid speculum was removed.  Maxitrol ointment was applied to the surface of the left eye which was then patched and shielded.    The patient tolerated the procedure well and was brought to the postoperative care unit in stable condition. DATE OF PROCEDURE: 05/16/23    SUREGEON : SOCORRO LITTLE MD      ASSISTANT(S):  MAX CLARK MD    ANESTHESIA:  MAC.    PREOPERATIVE DIAGNOSIS(ES):  Cataract, astigmatism and glaucoma, left eye.    POSTOPERATIVE DIAGNOSIS(ES):  Cataract, astigmatism and glaucoma, left eye.    OPERATION:  Cataract extraction with toric intraocular lens insertion and trabeculectomy revision, left eye.    IMPLANTS:  J&J Eyhance Toric [id number] 23.0 D lens, serial number [id number], expiration date 05/25/2024    COMPLICATIONS:  None.    SPECIMENS:  None.    ESTIMATED BLOOD LOSS: <0.5 cc    DESCRIPTION OF PROCEDURE:  The patient was identified in the holding area.  The risks, benefits, and alternatives of surgery were discussed with the patient at length.  Informed consent was obtained.  The left eye was identified and marked.  The patient was brought to the operating room.  The cornea was marked at 6 and 9 o'clock meridians while seated in an upright position.  Then, the patient was then placed in a supine position on the operating table. The left eye was prepped and draped in the usual sterile fashion for intraocular surgery, and a lid speculum was then placed underneath the operative eye.  A Bazzi ring was placed on the cornea, and the 92 degree axis was marked. A sideport blade was used to create a paracentesis.  Preservative-free 1% lidocaine was injected into the anterior chamber followed by air and trypan blue. Healon was injected to remove the residual trypan and fill the anterior chamber.    A 2.4 keratome was used to create a temporal clear corneal incision.  A cystotome was used to begin a continuous curvilinear capsulorrhexis which was finished with Utrata forceps. Then Viscoat was injected into the ostium of the trabeculectomy. Hydrodissection was done with BSS, and the lens was noted to rotate freely in the capsular bag.  Phacoemulsification was accomplished using the divide-and-conquer technique without complications. Residual cortical material was removed using single handpiece irrigation and aspiration.  Lidocaine 1% was injected into the anterior chamber.  Healon was then injected into the capsular bag.    A [id number] 23.0 D lens was implanted into the capsular bag and rotated to the 92 degree axis using a Kuglen hook. Viscoelastic was removed using irrigation and aspiration along with residual cortical material. The tip of the IA handpiece was then inserted into the ostium of the trabeculectomy. Irrigation/aspiration was performed in the ostium site with good posterior flow noted. Miochol*** was injected into the anterior chamber.  A 10-0 nylon suture was used to close the main wound. All wounds were hydrated and found to be watertight. The lens was centered, and the anterior chamber was deep.  Intraocular pressure was kept in the teens at the end of the case to prevent postoperative bleeding.  No complications were noted.  Topical antibiotics and steroids were applied to the surface of the left eye.  The eyelid speculum was removed.  Maxitrol ointment was applied to the surface of the left eye which was then patched and shielded.    The patient tolerated the procedure well and was brought to the postoperative care unit in stable condition. DATE OF PROCEDURE: 05/16/23    SUREGEON : SOCORRO LITTLE MD      ASSISTANT(S):  MAX CLARK MD    ANESTHESIA:  MAC.    PREOPERATIVE DIAGNOSIS(ES):  Cataract, astigmatism and glaucoma, left eye.    POSTOPERATIVE DIAGNOSIS(ES):  Cataract, astigmatism and glaucoma, left eye.    OPERATION:  Cataract extraction with toric intraocular lens insertion and trabeculectomy revision, left eye.    IMPLANTS:  J&J Eyhance Toric [id number] 23.0 D lens, serial number [id number], expiration date 05/25/2024    COMPLICATIONS:  None.    SPECIMENS:  None.    ESTIMATED BLOOD LOSS: <0.5 cc    DESCRIPTION OF PROCEDURE:  The patient was identified in the holding area.  The risks, benefits, and alternatives of surgery were discussed with the patient at length.  Informed consent was obtained.  The left eye was identified and marked.  The patient was brought to the operating room.  The cornea was marked at 6 and 9 o'clock meridians while seated in an upright position.  Then, the patient was then placed in a supine position on the operating table. The left eye was prepped and draped in the usual sterile fashion for intraocular surgery, and a lid speculum was then placed underneath the operative eye.  A Bazzi ring was placed on the cornea, and the 92 degree axis was marked. A sideport blade was used to create a paracentesis.  Preservative-free 1% lidocaine was injected into the anterior chamber followed by air and trypan blue. Healon was injected to remove the residual trypan and fill the anterior chamber.    A 2.4 keratome was used to create a temporal clear corneal incision.  A cystotome was used to begin a continuous curvilinear capsulorrhexis which was finished with Utrata forceps. Then Viscoat was injected into the ostium of the trabeculectomy. Hydrodissection was done with BSS, and the lens was noted to rotate freely in the capsular bag.  Phacoemulsification was accomplished using the divide-and-conquer technique without complications. Residual cortical material was removed using single handpiece irrigation and aspiration.  Lidocaine 1% was injected into the anterior chamber.  Healon was then injected into the capsular bag.    A [id number] 23.0 D lens was implanted into the capsular bag and rotated to the 92 degree axis using a Kuglen hook. Viscoelastic was removed using irrigation and aspiration along with residual cortical material. The tip of the IA handpiece was then inserted into the ostium of the trabeculectomy. Irrigation/aspiration was performed in the ostium site with good posterior flow noted. Miochol was injected into the anterior chamber.  Two interrupted 10-0 nylon sutures were used to close the main wound and one 10-0 nylon suture was used to close the para. All wounds were hydrated and found to be watertight. The lens was centered, and the anterior chamber was deep.  Intraocular pressure was kept in the teens at the end of the case to prevent postoperative bleeding.  No complications were noted.  Topical antibiotics and steroids were applied to the surface of the left eye.  The eyelid speculum was removed.  Maxitrol ointment was applied to the surface of the left eye which was then patched and shielded.    The patient tolerated the procedure well and was brought to the postoperative care unit in stable condition.

## 2023-05-15 NOTE — ASU PATIENT PROFILE, ADULT - NSICDXPASTMEDICALHX_GEN_ALL_CORE_FT
PAST MEDICAL HISTORY:  CAD (coronary artery disease)     Hypercholesteremia     Mild asthma pt states he doent get attacks and hasnt used albuterol in 20+ years    Stented coronary artery 2 cardiac stents (7 years ago)

## 2023-05-15 NOTE — ASU PATIENT PROFILE, ADULT - AS SC BRADEN SENSORY
Type 2 diabetes mellitus with hyperglycemia, without long-term current use of insulin Humerus fracture Hyperlipidemia, unspecified hyperlipidemia type Type 2 diabetes mellitus with hyperglycemia, without long-term current use of insulin Hyperlipidemia, unspecified hyperlipidemia type UTI (urinary tract infection) UTI (urinary tract infection) UTI (urinary tract infection) UTI (urinary tract infection) (4) no impairment Type 2 diabetes mellitus with hyperglycemia, without long-term current use of insulin Type 2 diabetes mellitus with hyperglycemia, without long-term current use of insulin

## 2023-05-16 ENCOUNTER — NON-APPOINTMENT (OUTPATIENT)
Age: 73
End: 2023-05-16

## 2023-05-16 ENCOUNTER — OUTPATIENT (OUTPATIENT)
Dept: OUTPATIENT SERVICES | Facility: HOSPITAL | Age: 73
LOS: 1 days | Discharge: HOME | End: 2023-05-16
Payer: COMMERCIAL

## 2023-05-16 ENCOUNTER — APPOINTMENT (OUTPATIENT)
Dept: OPHTHALMOLOGY | Facility: AMBULATORY SURGERY CENTER | Age: 73
End: 2023-05-16

## 2023-05-16 VITALS
HEART RATE: 65 BPM | SYSTOLIC BLOOD PRESSURE: 133 MMHG | TEMPERATURE: 99 F | DIASTOLIC BLOOD PRESSURE: 73 MMHG | OXYGEN SATURATION: 98 % | RESPIRATION RATE: 16 BRPM

## 2023-05-16 VITALS
WEIGHT: 181.66 LBS | TEMPERATURE: 98 F | HEART RATE: 67 BPM | HEIGHT: 69 IN | RESPIRATION RATE: 16 BRPM | DIASTOLIC BLOOD PRESSURE: 69 MMHG | OXYGEN SATURATION: 98 % | SYSTOLIC BLOOD PRESSURE: 122 MMHG

## 2023-05-16 DIAGNOSIS — Z95.5 PRESENCE OF CORONARY ANGIOPLASTY IMPLANT AND GRAFT: Chronic | ICD-10-CM

## 2023-05-16 DIAGNOSIS — Z98.890 OTHER SPECIFIED POSTPROCEDURAL STATES: Chronic | ICD-10-CM

## 2023-05-16 PROCEDURE — 66984 XCAPSL CTRC RMVL W/O ECP: CPT | Mod: LT

## 2023-05-16 DEVICE — IMPLANTABLE DEVICE
Type: IMPLANTABLE DEVICE | Site: LEFT | Status: NON-FUNCTIONAL
Removed: 2023-05-16

## 2023-05-16 RX ORDER — ACETAMINOPHEN 500 MG
650 TABLET ORAL ONCE
Refills: 0 | Status: DISCONTINUED | OUTPATIENT
Start: 2023-05-16 | End: 2023-05-16

## 2023-05-16 RX ORDER — PHENYLEPHRINE HCL 2.5 %
1 DROPS OPHTHALMIC (EYE)
Refills: 0 | Status: COMPLETED | OUTPATIENT
Start: 2023-05-16 | End: 2023-05-16

## 2023-05-16 RX ORDER — OFLOXACIN 0.3 %
1 DROPS OPHTHALMIC (EYE)
Refills: 0 | Status: COMPLETED | OUTPATIENT
Start: 2023-05-16 | End: 2023-05-16

## 2023-05-16 RX ORDER — ASPIRIN/CALCIUM CARB/MAGNESIUM 324 MG
1 TABLET ORAL
Qty: 0 | Refills: 0 | DISCHARGE

## 2023-05-16 RX ORDER — ATORVASTATIN CALCIUM 80 MG/1
1 TABLET, FILM COATED ORAL
Qty: 0 | Refills: 0 | DISCHARGE

## 2023-05-16 RX ORDER — TROPICAMIDE 1 %
1 DROPS OPHTHALMIC (EYE)
Refills: 0 | Status: COMPLETED | OUTPATIENT
Start: 2023-05-16 | End: 2023-05-16

## 2023-05-16 RX ORDER — CYCLOPENTOLATE HYDROCHLORIDE 10 MG/ML
1 SOLUTION/ DROPS OPHTHALMIC
Refills: 0 | Status: COMPLETED | OUTPATIENT
Start: 2023-05-16 | End: 2023-05-16

## 2023-05-16 RX ORDER — ONDANSETRON 8 MG/1
4 TABLET, FILM COATED ORAL ONCE
Refills: 0 | Status: DISCONTINUED | OUTPATIENT
Start: 2023-05-16 | End: 2023-05-16

## 2023-05-16 RX ADMIN — Medication 1 DROP(S): at 10:35

## 2023-05-16 RX ADMIN — Medication 1 DROP(S): at 10:30

## 2023-05-16 RX ADMIN — CYCLOPENTOLATE HYDROCHLORIDE 1 DROP(S): 10 SOLUTION/ DROPS OPHTHALMIC at 10:30

## 2023-05-16 RX ADMIN — Medication 1 DROP(S): at 10:25

## 2023-05-16 RX ADMIN — CYCLOPENTOLATE HYDROCHLORIDE 1 DROP(S): 10 SOLUTION/ DROPS OPHTHALMIC at 10:35

## 2023-05-16 RX ADMIN — CYCLOPENTOLATE HYDROCHLORIDE 1 DROP(S): 10 SOLUTION/ DROPS OPHTHALMIC at 10:25

## 2023-05-16 NOTE — ASU PREOP CHECKLIST - COMMENTS
no solid food after 23:00, no liquids after 08:45 (1 glass of water and 1/2 cup of coffee little sugar, no milk

## 2023-05-17 ENCOUNTER — APPOINTMENT (OUTPATIENT)
Dept: OPHTHALMOLOGY | Facility: CLINIC | Age: 73
End: 2023-05-17
Payer: COMMERCIAL

## 2023-05-17 ENCOUNTER — NON-APPOINTMENT (OUTPATIENT)
Age: 73
End: 2023-05-17

## 2023-05-17 PROBLEM — Z95.5 PRESENCE OF CORONARY ANGIOPLASTY IMPLANT AND GRAFT: Chronic | Status: ACTIVE | Noted: 2022-10-03

## 2023-05-17 PROCEDURE — 99024 POSTOP FOLLOW-UP VISIT: CPT

## 2023-05-25 ENCOUNTER — APPOINTMENT (OUTPATIENT)
Dept: OPHTHALMOLOGY | Facility: CLINIC | Age: 73
End: 2023-05-25
Payer: COMMERCIAL

## 2023-05-25 ENCOUNTER — NON-APPOINTMENT (OUTPATIENT)
Age: 73
End: 2023-05-25

## 2023-05-25 PROCEDURE — 99024 POSTOP FOLLOW-UP VISIT: CPT

## 2023-06-09 ENCOUNTER — NON-APPOINTMENT (OUTPATIENT)
Age: 73
End: 2023-06-09

## 2023-06-09 ENCOUNTER — APPOINTMENT (OUTPATIENT)
Dept: OPHTHALMOLOGY | Facility: CLINIC | Age: 73
End: 2023-06-09
Payer: COMMERCIAL

## 2023-06-09 PROCEDURE — 92012 INTRM OPH EXAM EST PATIENT: CPT | Mod: 24

## 2023-06-20 NOTE — ASU PREOP CHECKLIST - LATEX ALLERGY
Received request via: Pharmacy    Was the patient seen in the last year in this department? Yes    Does the patient have an active prescription (recently filled or refills available) for medication(s) requested? No    Does the patient have snf Plus and need 100 day supply (blood pressure, diabetes and cholesterol meds only)? Patient does not have SCP  
no

## 2023-06-28 ENCOUNTER — NON-APPOINTMENT (OUTPATIENT)
Age: 73
End: 2023-06-28

## 2023-06-28 ENCOUNTER — APPOINTMENT (OUTPATIENT)
Dept: OPHTHALMOLOGY | Facility: CLINIC | Age: 73
End: 2023-06-28
Payer: COMMERCIAL

## 2023-06-28 PROCEDURE — 92002 INTRM OPH EXAM NEW PATIENT: CPT

## 2023-07-11 ENCOUNTER — APPOINTMENT (OUTPATIENT)
Dept: OPHTHALMOLOGY | Facility: CLINIC | Age: 73
End: 2023-07-11

## 2023-07-17 ENCOUNTER — APPOINTMENT (OUTPATIENT)
Dept: OPHTHALMOLOGY | Facility: CLINIC | Age: 73
End: 2023-07-17
Payer: COMMERCIAL

## 2023-07-17 ENCOUNTER — NON-APPOINTMENT (OUTPATIENT)
Age: 73
End: 2023-07-17

## 2023-07-17 PROCEDURE — 99024 POSTOP FOLLOW-UP VISIT: CPT

## 2023-07-18 ENCOUNTER — APPOINTMENT (OUTPATIENT)
Dept: OPHTHALMOLOGY | Facility: CLINIC | Age: 73
End: 2023-07-18
Payer: COMMERCIAL

## 2023-07-18 ENCOUNTER — NON-APPOINTMENT (OUTPATIENT)
Age: 73
End: 2023-07-18

## 2023-07-18 PROCEDURE — 99024 POSTOP FOLLOW-UP VISIT: CPT

## 2023-08-03 ENCOUNTER — NON-APPOINTMENT (OUTPATIENT)
Age: 73
End: 2023-08-03

## 2023-08-03 ENCOUNTER — APPOINTMENT (OUTPATIENT)
Dept: OPHTHALMOLOGY | Facility: CLINIC | Age: 73
End: 2023-08-03
Payer: COMMERCIAL

## 2023-08-03 PROCEDURE — 99024 POSTOP FOLLOW-UP VISIT: CPT

## 2023-08-16 ENCOUNTER — NON-APPOINTMENT (OUTPATIENT)
Age: 73
End: 2023-08-16

## 2023-08-16 ENCOUNTER — APPOINTMENT (OUTPATIENT)
Dept: OPHTHALMOLOGY | Facility: CLINIC | Age: 73
End: 2023-08-16
Payer: COMMERCIAL

## 2023-08-16 PROCEDURE — 92083 EXTENDED VISUAL FIELD XM: CPT

## 2023-08-16 PROCEDURE — 92012 INTRM OPH EXAM EST PATIENT: CPT

## 2023-09-15 ENCOUNTER — NON-APPOINTMENT (OUTPATIENT)
Age: 73
End: 2023-09-15

## 2023-09-15 ENCOUNTER — APPOINTMENT (OUTPATIENT)
Dept: OPHTHALMOLOGY | Facility: CLINIC | Age: 73
End: 2023-09-15
Payer: COMMERCIAL

## 2023-09-15 PROCEDURE — 65222 REMOVE FOREIGN BODY FROM EYE: CPT | Mod: RT

## 2023-09-15 PROCEDURE — 92012 INTRM OPH EXAM EST PATIENT: CPT | Mod: 25

## 2023-10-09 ENCOUNTER — APPOINTMENT (OUTPATIENT)
Dept: OPHTHALMOLOGY | Facility: CLINIC | Age: 73
End: 2023-10-09
Payer: COMMERCIAL

## 2023-10-09 ENCOUNTER — NON-APPOINTMENT (OUTPATIENT)
Age: 73
End: 2023-10-09

## 2023-10-09 PROCEDURE — 66821 AFTER CATARACT LASER SURGERY: CPT | Mod: LT

## 2023-12-04 ENCOUNTER — APPOINTMENT (OUTPATIENT)
Dept: OPHTHALMOLOGY | Facility: CLINIC | Age: 73
End: 2023-12-04
Payer: COMMERCIAL

## 2023-12-04 ENCOUNTER — NON-APPOINTMENT (OUTPATIENT)
Age: 73
End: 2023-12-04

## 2023-12-04 PROCEDURE — 99024 POSTOP FOLLOW-UP VISIT: CPT

## 2023-12-04 PROCEDURE — 92134 CPTRZ OPH DX IMG PST SGM RTA: CPT

## 2024-01-09 ENCOUNTER — APPOINTMENT (OUTPATIENT)
Dept: OPHTHALMOLOGY | Facility: CLINIC | Age: 74
End: 2024-01-09
Payer: COMMERCIAL

## 2024-01-09 ENCOUNTER — NON-APPOINTMENT (OUTPATIENT)
Age: 74
End: 2024-01-09

## 2024-01-09 PROCEDURE — 92012 INTRM OPH EXAM EST PATIENT: CPT

## 2024-01-31 ENCOUNTER — APPOINTMENT (OUTPATIENT)
Dept: OPHTHALMOLOGY | Facility: CLINIC | Age: 74
End: 2024-01-31
Payer: COMMERCIAL

## 2024-01-31 ENCOUNTER — NON-APPOINTMENT (OUTPATIENT)
Age: 74
End: 2024-01-31

## 2024-01-31 PROCEDURE — 92012 INTRM OPH EXAM EST PATIENT: CPT

## 2024-01-31 PROCEDURE — 92133 CPTRZD OPH DX IMG PST SGM ON: CPT

## 2024-02-02 ENCOUNTER — NON-APPOINTMENT (OUTPATIENT)
Age: 74
End: 2024-02-02

## 2024-02-02 ENCOUNTER — APPOINTMENT (OUTPATIENT)
Dept: OPHTHALMOLOGY | Facility: CLINIC | Age: 74
End: 2024-02-02
Payer: COMMERCIAL

## 2024-02-02 PROCEDURE — 92012 INTRM OPH EXAM EST PATIENT: CPT

## 2024-02-16 ENCOUNTER — NON-APPOINTMENT (OUTPATIENT)
Age: 74
End: 2024-02-16

## 2024-02-16 ENCOUNTER — APPOINTMENT (OUTPATIENT)
Dept: OPHTHALMOLOGY | Facility: CLINIC | Age: 74
End: 2024-02-16
Payer: COMMERCIAL

## 2024-02-16 PROCEDURE — 92014 COMPRE OPH EXAM EST PT 1/>: CPT

## 2024-02-22 ENCOUNTER — NON-APPOINTMENT (OUTPATIENT)
Age: 74
End: 2024-02-22

## 2024-02-22 ENCOUNTER — APPOINTMENT (OUTPATIENT)
Dept: OPHTHALMOLOGY | Facility: CLINIC | Age: 74
End: 2024-02-22
Payer: COMMERCIAL

## 2024-02-22 PROCEDURE — 99442: CPT

## 2024-03-22 ENCOUNTER — NON-APPOINTMENT (OUTPATIENT)
Age: 74
End: 2024-03-22

## 2024-03-22 ENCOUNTER — APPOINTMENT (OUTPATIENT)
Dept: OPHTHALMOLOGY | Facility: CLINIC | Age: 74
End: 2024-03-22
Payer: COMMERCIAL

## 2024-03-22 PROCEDURE — 92012 INTRM OPH EXAM EST PATIENT: CPT | Mod: 25

## 2024-03-22 PROCEDURE — 65222 REMOVE FOREIGN BODY FROM EYE: CPT | Mod: LT

## 2024-04-23 ENCOUNTER — NON-APPOINTMENT (OUTPATIENT)
Age: 74
End: 2024-04-23

## 2024-04-23 ENCOUNTER — APPOINTMENT (OUTPATIENT)
Dept: OPHTHALMOLOGY | Facility: CLINIC | Age: 74
End: 2024-04-23
Payer: COMMERCIAL

## 2024-04-23 PROCEDURE — 92012 INTRM OPH EXAM EST PATIENT: CPT

## 2024-05-21 ENCOUNTER — NON-APPOINTMENT (OUTPATIENT)
Age: 74
End: 2024-05-21

## 2024-05-21 ENCOUNTER — APPOINTMENT (OUTPATIENT)
Dept: OPHTHALMOLOGY | Facility: CLINIC | Age: 74
End: 2024-05-21
Payer: COMMERCIAL

## 2024-05-21 PROCEDURE — 92012 INTRM OPH EXAM EST PATIENT: CPT

## 2024-06-28 ENCOUNTER — APPOINTMENT (OUTPATIENT)
Dept: OPHTHALMOLOGY | Facility: CLINIC | Age: 74
End: 2024-06-28

## 2024-06-28 PROCEDURE — 92012 INTRM OPH EXAM EST PATIENT: CPT

## 2024-07-18 ENCOUNTER — APPOINTMENT (OUTPATIENT)
Dept: OPHTHALMOLOGY | Facility: CLINIC | Age: 74
End: 2024-07-18
Payer: COMMERCIAL

## 2024-07-18 ENCOUNTER — NON-APPOINTMENT (OUTPATIENT)
Age: 74
End: 2024-07-18

## 2024-07-18 PROCEDURE — 92014 COMPRE OPH EXAM EST PT 1/>: CPT

## 2024-07-18 PROCEDURE — 92250 FUNDUS PHOTOGRAPHY W/I&R: CPT

## 2024-08-16 ENCOUNTER — NON-APPOINTMENT (OUTPATIENT)
Age: 74
End: 2024-08-16

## 2024-08-16 ENCOUNTER — APPOINTMENT (OUTPATIENT)
Dept: OPHTHALMOLOGY | Facility: CLINIC | Age: 74
End: 2024-08-16

## 2024-08-16 PROCEDURE — 92014 COMPRE OPH EXAM EST PT 1/>: CPT

## 2024-12-05 ENCOUNTER — NON-APPOINTMENT (OUTPATIENT)
Age: 74
End: 2024-12-05

## 2024-12-05 ENCOUNTER — APPOINTMENT (OUTPATIENT)
Dept: OPHTHALMOLOGY | Facility: CLINIC | Age: 74
End: 2024-12-05
Payer: COMMERCIAL

## 2024-12-05 PROCEDURE — 92012 INTRM OPH EXAM EST PATIENT: CPT

## 2025-03-04 ENCOUNTER — NON-APPOINTMENT (OUTPATIENT)
Age: 75
End: 2025-03-04

## 2025-03-04 ENCOUNTER — APPOINTMENT (OUTPATIENT)
Dept: OPHTHALMOLOGY | Facility: CLINIC | Age: 75
End: 2025-03-04
Payer: COMMERCIAL

## 2025-03-04 PROCEDURE — 92012 INTRM OPH EXAM EST PATIENT: CPT

## 2025-03-19 ENCOUNTER — APPOINTMENT (OUTPATIENT)
Dept: OPHTHALMOLOGY | Facility: CLINIC | Age: 75
End: 2025-03-19
Payer: COMMERCIAL

## 2025-03-19 ENCOUNTER — NON-APPOINTMENT (OUTPATIENT)
Age: 75
End: 2025-03-19

## 2025-03-19 PROCEDURE — 92012 INTRM OPH EXAM EST PATIENT: CPT

## 2025-03-19 PROCEDURE — 92083 EXTENDED VISUAL FIELD XM: CPT

## 2025-03-19 PROCEDURE — 92133 CPTRZD OPH DX IMG PST SGM ON: CPT

## 2025-03-24 ENCOUNTER — APPOINTMENT (OUTPATIENT)
Dept: OPHTHALMOLOGY | Facility: CLINIC | Age: 75
End: 2025-03-24

## 2025-04-09 ENCOUNTER — APPOINTMENT (OUTPATIENT)
Dept: OPHTHALMOLOGY | Facility: CLINIC | Age: 75
End: 2025-04-09
Payer: COMMERCIAL

## 2025-04-09 ENCOUNTER — NON-APPOINTMENT (OUTPATIENT)
Age: 75
End: 2025-04-09

## 2025-04-09 PROCEDURE — 92012 INTRM OPH EXAM EST PATIENT: CPT

## 2025-04-09 PROCEDURE — 92134 CPTRZ OPH DX IMG PST SGM RTA: CPT

## 2025-04-30 ENCOUNTER — APPOINTMENT (OUTPATIENT)
Dept: OPHTHALMOLOGY | Facility: CLINIC | Age: 75
End: 2025-04-30
Payer: COMMERCIAL

## 2025-04-30 ENCOUNTER — NON-APPOINTMENT (OUTPATIENT)
Age: 75
End: 2025-04-30

## 2025-04-30 PROCEDURE — 92012 INTRM OPH EXAM EST PATIENT: CPT

## 2025-06-10 ENCOUNTER — APPOINTMENT (OUTPATIENT)
Dept: OPHTHALMOLOGY | Facility: CLINIC | Age: 75
End: 2025-06-10
Payer: MEDICARE

## 2025-06-10 ENCOUNTER — NON-APPOINTMENT (OUTPATIENT)
Age: 75
End: 2025-06-10

## 2025-06-10 PROCEDURE — 92002 INTRM OPH EXAM NEW PATIENT: CPT

## 2025-07-29 ENCOUNTER — NON-APPOINTMENT (OUTPATIENT)
Age: 75
End: 2025-07-29

## 2025-07-30 ENCOUNTER — NON-APPOINTMENT (OUTPATIENT)
Age: 75
End: 2025-07-30

## 2025-07-30 ENCOUNTER — APPOINTMENT (OUTPATIENT)
Dept: OPHTHALMOLOGY | Facility: CLINIC | Age: 75
End: 2025-07-30
Payer: MEDICARE

## 2025-07-30 PROCEDURE — 92012 INTRM OPH EXAM EST PATIENT: CPT

## 2025-07-30 PROCEDURE — 92133 CPTRZD OPH DX IMG PST SGM ON: CPT

## 2025-08-20 ENCOUNTER — APPOINTMENT (OUTPATIENT)
Dept: OPHTHALMOLOGY | Facility: CLINIC | Age: 75
End: 2025-08-20

## 2025-09-09 ENCOUNTER — NON-APPOINTMENT (OUTPATIENT)
Age: 75
End: 2025-09-09

## 2025-09-09 ENCOUNTER — APPOINTMENT (OUTPATIENT)
Dept: OPHTHALMOLOGY | Facility: CLINIC | Age: 75
End: 2025-09-09
Payer: MEDICARE

## 2025-09-09 PROCEDURE — 92012 INTRM OPH EXAM EST PATIENT: CPT

## (undated) DEVICE — PACK ANTERIOR SEGMENT

## (undated) DEVICE — NDL HYPO SAFE 25G X 5/8" (ORANGE)

## (undated) DEVICE — KIT CENTURION ANTERIOR

## (undated) DEVICE — GONIO LENS IPRISM S 1.1X LEFT HAND

## (undated) DEVICE — WARMING BLANKET UPPER ADULT

## (undated) DEVICE — GOWN SLEEVES

## (undated) DEVICE — DRAPE MICROSCOPE KNOB COVER SMALL (2 PCS)

## (undated) DEVICE — SPEAR CELLULOSE 40410

## (undated) DEVICE — Device

## (undated) DEVICE — BLADE MULTI SIDED MICROSCLECTROMY

## (undated) DEVICE — KNIFE ALCON CRESCENT ANGLED BEVEL UP 2.3MM (PINK)

## (undated) DEVICE — GLV 6.5 PROTEXIS (WHITE)

## (undated) DEVICE — WARMING BLANKET LOWER ADULT

## (undated) DEVICE — KNIFE ALCON PARACENTESIS CLEARCUT SIDEPORT 1MM (YELLOW)

## (undated) DEVICE — PUNCH TREPH DISP STRL 8MM

## (undated) DEVICE — ELCTR BIPOLAR CORD J&J 12FT DISP

## (undated) DEVICE — SUT NYLON 10-0 12" CU-5

## (undated) DEVICE — PETRI DISH MED 3.5"

## (undated) DEVICE — CANNULA IRR ALCON ANTERIOR CHAMBER 30G

## (undated) DEVICE — CANNULA IRR ANT CHAMBER 30G

## (undated) DEVICE — CARTRIDGE PLATINUM

## (undated) DEVICE — GLV 7.5 PROTEXIS (WHITE)

## (undated) DEVICE — NDL HYPO REGULAR BEVEL 30G X .5"

## (undated) DEVICE — APPLICATOR COTTON TIP 3" STERILE

## (undated) DEVICE — TRANSFORMER INTREPID I/A 0.3MM

## (undated) DEVICE — PACK CENTURION 2.4MM

## (undated) DEVICE — SOL IRR BAG BSS 500ML

## (undated) DEVICE — MARKING PEN DEVON DUAL TIP W RULER

## (undated) DEVICE — ELCTR ERASER BI-P BVL 45DEG 18G

## (undated) DEVICE — CULTURESWAB WITHOUT CHARCOAL